# Patient Record
Sex: FEMALE | Race: WHITE | NOT HISPANIC OR LATINO | Employment: FULL TIME | URBAN - METROPOLITAN AREA
[De-identification: names, ages, dates, MRNs, and addresses within clinical notes are randomized per-mention and may not be internally consistent; named-entity substitution may affect disease eponyms.]

---

## 2017-01-13 ENCOUNTER — ALLSCRIPTS OFFICE VISIT (OUTPATIENT)
Dept: OTHER | Facility: OTHER | Age: 37
End: 2017-01-13

## 2017-01-13 DIAGNOSIS — N92.0 EXCESSIVE AND FREQUENT MENSTRUATION WITH REGULAR CYCLE: ICD-10-CM

## 2017-01-18 LAB
HPV 18 (HISTORICAL): NOT DETECTED
HPV HIGH RISK 16/18 (HISTORICAL): NOT DETECTED
HPV16 (HISTORICAL): NOT DETECTED
PAP (HISTORICAL): NORMAL

## 2017-01-26 ENCOUNTER — GENERIC CONVERSION - ENCOUNTER (OUTPATIENT)
Dept: OTHER | Facility: OTHER | Age: 37
End: 2017-01-26

## 2017-01-30 ENCOUNTER — ALLSCRIPTS OFFICE VISIT (OUTPATIENT)
Dept: OTHER | Facility: OTHER | Age: 37
End: 2017-01-30

## 2017-02-02 ENCOUNTER — TRANSCRIBE ORDERS (OUTPATIENT)
Dept: LAB | Facility: HOSPITAL | Age: 37
End: 2017-02-02

## 2017-02-02 ENCOUNTER — HOSPITAL ENCOUNTER (OUTPATIENT)
Dept: RADIOLOGY | Facility: HOSPITAL | Age: 37
Discharge: HOME | End: 2017-02-02
Payer: COMMERCIAL

## 2017-02-02 ENCOUNTER — APPOINTMENT (OUTPATIENT)
Dept: LAB | Facility: HOSPITAL | Age: 37
End: 2017-02-02
Payer: COMMERCIAL

## 2017-02-02 DIAGNOSIS — N92.0 EXCESSIVE AND FREQUENT MENSTRUATION WITH REGULAR CYCLE: ICD-10-CM

## 2017-02-02 LAB
ERYTHROCYTE [DISTWIDTH] IN BLOOD BY AUTOMATED COUNT: 13.1 % (ref 11.6–15.1)
HCT VFR BLD AUTO: 38.5 % (ref 34.8–46.1)
HGB BLD-MCNC: 12.9 G/DL (ref 11.5–15.4)
MCH RBC QN AUTO: 30.8 PG (ref 26.8–34.3)
MCHC RBC AUTO-ENTMCNC: 33.5 G/DL (ref 31.4–37.4)
MCV RBC AUTO: 92 FL (ref 82–98)
PLATELET # BLD AUTO: 261 THOUSANDS/UL (ref 149–390)
PMV BLD AUTO: 10 FL (ref 8.9–12.7)
RBC # BLD AUTO: 4.19 MILLION/UL (ref 3.81–5.12)
T4 FREE SERPL-MCNC: 0.96 NG/DL (ref 0.76–1.46)
TSH SERPL DL<=0.05 MIU/L-ACNC: 0.89 UIU/ML (ref 0.36–3.74)
WBC # BLD AUTO: 9.47 THOUSAND/UL (ref 4.31–10.16)

## 2017-02-02 PROCEDURE — 76830 TRANSVAGINAL US NON-OB: CPT

## 2017-02-02 PROCEDURE — 84439 ASSAY OF FREE THYROXINE: CPT

## 2017-02-02 PROCEDURE — 76856 US EXAM PELVIC COMPLETE: CPT

## 2017-02-02 PROCEDURE — 84443 ASSAY THYROID STIM HORMONE: CPT

## 2017-02-02 PROCEDURE — 36415 COLL VENOUS BLD VENIPUNCTURE: CPT

## 2017-02-02 PROCEDURE — 85027 COMPLETE CBC AUTOMATED: CPT

## 2017-04-20 ENCOUNTER — ALLSCRIPTS OFFICE VISIT (OUTPATIENT)
Dept: OTHER | Facility: OTHER | Age: 37
End: 2017-04-20

## 2017-04-20 DIAGNOSIS — N64.4 MASTODYNIA: ICD-10-CM

## 2017-05-01 ENCOUNTER — HOSPITAL ENCOUNTER (OUTPATIENT)
Dept: ULTRASOUND IMAGING | Facility: CLINIC | Age: 37
Discharge: HOME | End: 2017-05-01
Payer: COMMERCIAL

## 2017-05-01 ENCOUNTER — HOSPITAL ENCOUNTER (OUTPATIENT)
Dept: MAMMOGRAPHY | Facility: CLINIC | Age: 37
Discharge: HOME | End: 2017-05-01
Payer: COMMERCIAL

## 2017-05-01 DIAGNOSIS — N64.4 MASTODYNIA: ICD-10-CM

## 2017-05-01 PROCEDURE — G0204 DX MAMMO INCL CAD BI: HCPCS

## 2017-05-01 PROCEDURE — 76642 ULTRASOUND BREAST LIMITED: CPT

## 2017-05-02 ENCOUNTER — GENERIC CONVERSION - ENCOUNTER (OUTPATIENT)
Dept: OTHER | Facility: OTHER | Age: 37
End: 2017-05-02

## 2017-05-04 ENCOUNTER — ALLSCRIPTS OFFICE VISIT (OUTPATIENT)
Dept: OTHER | Facility: OTHER | Age: 37
End: 2017-05-04

## 2017-05-26 DIAGNOSIS — M25.529 PAIN IN ELBOW: ICD-10-CM

## 2017-06-01 ENCOUNTER — HOSPITAL ENCOUNTER (OUTPATIENT)
Dept: RADIOLOGY | Facility: CLINIC | Age: 37
Discharge: HOME | End: 2017-06-01
Payer: COMMERCIAL

## 2017-06-01 ENCOUNTER — ALLSCRIPTS OFFICE VISIT (OUTPATIENT)
Dept: OTHER | Facility: OTHER | Age: 37
End: 2017-06-01

## 2017-06-01 DIAGNOSIS — M25.529 PAIN IN ELBOW: ICD-10-CM

## 2017-06-01 PROCEDURE — 73080 X-RAY EXAM OF ELBOW: CPT

## 2018-01-10 NOTE — PROGRESS NOTES
Assessment    1  Acute suppurative otitis media of left ear without spontaneous rupture of tympanic   membrane, recurrence not specified (382 00) (H66 002)    Plan  Acute suppurative otitis media of left ear without spontaneous rupture of tympanic  membrane, recurrence not specified    · Antipyrine-Benzocaine 54-14 MG/ML Otic Solution; use 2-3 drops left ear every 2-3  hours as needed for pain   · Sulfamethoxazole-Trimethoprim 800-160 MG Oral Tablet; Take 1 twice daily    Discussion/Summary    Has acute otitis left TM - in past had rash after using Amoxil or Keflex - will rx Bactrim DS BID x 10 days, use Celebrex 400 mg a day x 2-3 days Can use ear drops for pain as needed- discussed could perforate - call if any issues arise  Cough ongoing - no relief w inhaler - if not improved 5-7 days- call for medrol pack (does not feel well w steroids - try to avoid)    pain in foot from prev visit is improved- use celebrex as is and watch hold referral       Chief Complaint  c/o severe ear pain left      History of Present Illness  HPI: onset overnight left ear pain, mucus is clear upper resp - continues w cough  no fever  Sleeps well  Able to teach exercise class - no SOB/ wheeze - tried nebulizer w/out benefit  Active Problems    1  Acute bronchitis (466 0) (J20 9)   2  Encounter for routine gynecological examination (V72 31) (Z01 419)   3  Foot pain, bilateral (729 5) (M79 671,M79 672)   4  Lump of skin (782 2) (R22 9)   5  Menorrhagia (626 2) (N92 0)    Past Medical History    1  History of Depression (311) (F32 9)   2  History of hypertension (V12 59) (Z86 79)   3  History of migraine (V12 49) (Z86 69)   4  History of pilonidal cyst (V13 3) (Z87 2)   5  History of sleep disturbance (V13 89) (R38 248)    Family History    1  Family history of malignant neoplasm of breast (V16 3) (Z80 3)   2  Family history of skin cancer (V16 8) (Z80 8)    3  Family history of Stroke Syndrome (V17 1)    4   Family history of Diabetes Mellitus (V18 0)    5  Family history of Anemia (V18 2)   6  Family history of Arthritis (V17 7)   7  Family history of Heart Disease (V17 49)   8  Family history of Hypertension (V17 49)   9  Family history of Rheumatoid Arthritis    Social History    · Being A Social Drinker   · Exercising Regularly   · Marital History - Currently    · Never A Smoker    Surgical History    1  History of Complete Colonoscopy   2  History of Diagnostic Esophagogastroduodenoscopy   3  History of Incision And Drainage Of Pilonidal Cyst    Current Meds   1  Celecoxib 200 MG Oral Capsule; TAKE 1 CAP ONCE OR TWICE A DAY AS NEED (W/   FOOD); Therapy: 77SUF8084 to (Evaluate:86Ehk4627)  Requested for: 26QAV5012; Last   Rx:19Jan2016 Ordered   2  Peppermint Spirit Spirit; USE AS DIRECTED Recorded   3  Probiotic CAPS; USE AS DIRECTED Recorded   4  Promethazine-Codeine 6 25-10 MG/5ML Oral Syrup; TAKE 5 ML EVERY 4 TO 6 HOURS   AS NEEDED FOR COUGH; Therapy: 94QTS5673 to (Evaluate:02Jan2016); Last Rx:96Bes3681 Ordered    Allergies    1  Amoxicillin CAPS   2  Cephalosporins    Vitals   Recorded: 56MMA5239 02:02PM   Temperature 98 3 F   Heart Rate 92   Systolic 967   Diastolic 60   Height 5 ft 5 in   Weight 173 lb    BMI Calculated 28 79   BSA Calculated 1 86     Physical Exam    Constitutional   General appearance: No acute distress, well appearing and well nourished  Eyes   Conjunctiva and lids: No swelling, erythema or discharge  Ears, Nose, Mouth, and Throat red bulging left TM  Pulmonary   Auscultation of lungs: Clear to auscultation           Signatures   Electronically signed by : Renee Mercer DO; Jan 22 2016  2:35PM EST                       (Author)

## 2018-01-13 VITALS
SYSTOLIC BLOOD PRESSURE: 110 MMHG | DIASTOLIC BLOOD PRESSURE: 64 MMHG | BODY MASS INDEX: 30.82 KG/M2 | HEIGHT: 65 IN | WEIGHT: 185 LBS | HEART RATE: 68 BPM

## 2018-01-13 VITALS
WEIGHT: 178 LBS | DIASTOLIC BLOOD PRESSURE: 68 MMHG | HEART RATE: 68 BPM | BODY MASS INDEX: 27.94 KG/M2 | TEMPERATURE: 97.4 F | RESPIRATION RATE: 14 BRPM | SYSTOLIC BLOOD PRESSURE: 110 MMHG | HEIGHT: 67 IN

## 2018-01-13 NOTE — MISCELLANEOUS
Message   Recorded as Task   Date: 01/26/2017 08:43 AM, Created By: Gordo Garcias   Task Name: Call Back   Assigned To: Dhaval Bateman   Regarding Patient: Edgar Velazquez, Status: In Progress   Comment:    Chela Rinaldi - 26 Jan 2017 8:43 AM     TASK CREATED  Caller: Self; Other; [phone] (Home); [phone] (Work)  ON PROGESTERONE, on day 19,per pt feeling rage, not sleeping & going insane, pls call pt [phone] after 10:15   Reynold Barboza - 26 Jan 2017 11:20 AM     TASK IN PROGRESS   Reynold Barboza - 26 Jan 2017 11:29 AM     TASK EDITED  Pt on day 5 of aygestin and she is "raging" on it  She does get pms sx also  She said she is a  and gained 5 lbs also  Thanks   Ruchi Rodriguez - 26 Jan 2017 1:21 PM     TASK REPLIED TO: Previously Assigned To Ruchi Rodriguez  so if she is on day 5 of Aygestin then she is on day 19 of her cycle  She had pre-existing PMS symptoms (Prior to using Aygestin) so I don't think this is different  If she doesn't like it, she can stop it  She may get a period when she stops it, though  Reynold Barboza - 26 Jan 2017 1:49 PM     TASK EDITED  Her real issue for seeing you was that she has pms rage issues  Everything else is bothersome but she can deal with it but this underlying "rage"s awful  Much worse with aygestin (obviously)  She is willing to go on a ssri  She will have u/s in next few days but would like to start something asap  A friend did well on Zoloft ( per pt) but I think it takes a while to be fully effective  Thanks   Ruchi Rodriguez - 26 Jan 2017 2:00 PM     TASK REPLIED TO: Previously Assigned To Ruchi Rodriguez  Zoloft 25mg po daily x one week then 50mg po daily  Will take 3 months to see big change  Pill check in 3 months  (see my note from a week ago when she said she was completely uninterested in this     Reynold Barboza - 26 Jan 2017 3:11 PM     TASK EDITED  lmtcb   Reynold Barboza - 26 Jan 2017 3:30 PM     TASK EDITED  Pt will start on Zoloft today  Rx Zoloft, 25 mg, #7 sig 1 od and then switch to Zoloft 50 mg, #21, SIG 1 OD AND 2 REFILLS OF 50 MG,  #30  Pt will get u/s done and will make 3 mo apt for pill check        Active Problems    1  Chronic right shoulder pain (719 41,338 29) (M25 511,G89 29)   2  Encounter for gynecological examination with Papanicolaou smear of cervix   (V72 31,V76 2) (B14 016,S22 1)   3  Encounter for routine gynecological examination (V72 31) (Z01 419)   4  Fatigue (780 79) (R53 83)   5  Foot pain, bilateral (729 5) (M79 671,M79 672)   6  Left shoulder pain (719 41) (M25 512)   7  Mastodynia (611 71) (N64 4)   8  Menorrhagia (626 2) (N92 0)   9  Premenstrual syndrome (625 4) (N94 3)   10  Rotator cuff tendinitis, unspecified laterality (726 10) (M75 80)   11  Screening for HPV (human papillomavirus) (V73 81) (Z11 51)   12  Subluxation of left shoulder joint, subsequent encounter (V54 89,831 00) (S43 002D)    Current Meds   1  Norethindrone Acetate 5 MG Oral Tablet (Aygestin); one po daily days 14 to 25 of cycle; Therapy: 80MWV9139 to (Evaluate:59Met6416)  Requested for: 98YNV1007; Last   Rx:13Jan2017 Ordered   2  Probiotic CAPS; USE AS DIRECTED Recorded    Allergies    1  Cephalosporins    Signatures   Electronically signed by :  Triston 81, ; Jan 26 2017  3:30PM EST                       (Author)

## 2018-01-13 NOTE — RESULT NOTES
Verified Results  * XR FOOT 3+ VIEW LEFT 45RVO0937 12:27PM Bee José     Test Name Result Flag Reference   XR FOOT 3+ VW LEFT (Report)     LEFT FOOT     INDICATION: Bilateral foot and heel pain     COMPARISON: None     VIEWS: 3; 3 images     FINDINGS:     There is no acute fracture or dislocation  There is a prominent plantar calcaneal spur  No lytic or blastic lesions are seen  Soft tissues are unremarkable  IMPRESSION:     1  No acute osseous abnormality  2   plantar calcaneal spur         Signed by:   Dinah Bateman MD   1/19/16

## 2018-01-15 VITALS
DIASTOLIC BLOOD PRESSURE: 72 MMHG | HEIGHT: 65 IN | SYSTOLIC BLOOD PRESSURE: 112 MMHG | BODY MASS INDEX: 30.49 KG/M2 | WEIGHT: 183 LBS

## 2018-01-15 VITALS
HEIGHT: 65 IN | SYSTOLIC BLOOD PRESSURE: 102 MMHG | DIASTOLIC BLOOD PRESSURE: 68 MMHG | WEIGHT: 180 LBS | BODY MASS INDEX: 29.99 KG/M2

## 2018-01-15 NOTE — RESULT NOTES
Verified Results  CT SINUS WO CONTRAST [id number] 06:56AM Neel Prophet Order Number: [id number]   Performing Comments: recurrent otitis left w sinusitis left maxillary x 5 months   - Patient Instructions: To schedule this appointment, please contact Central Scheduling at [phone]  Test Name Result Flag Reference   CT SINUS WO CONTRAST (Report)     CT PARANASAL SINUSES     INDICATION: 54-year-old woman with congestion and earaches for 5 months  COMPARISON: None  TECHNIQUE: Axial CT imaging through the sinuses was performed  In addition, sagittal and coronal reformatted images were submitted for interpretation  Examination was performed utilizing techniques to minimize radiation dose, including the use of dose   reduction software  IMAGE QUALITY: Diagnostic  FINDINGS:      FRONTAL SINUSES: Mild mucosal thickening in the floors of the frontal sinuses  No free fluid  No mass  Partial obstruction of the frontal recesses due to mucosal thickening  ETHMOID SINUSES: Mild mucosal thickening in the anterior ethmoid cells  Posterior cells clear  No free fluid, mass or bone destruction  Prominent left ethmoid bulla  Ethmoid roof, cribriform plate and lamina papyracea intact  SPHENOID SINUSES: Mild mucosal thickening in the left-sided sphenoid sinus  No free fluid, mass or bone destruction  Sphenoethmoid recesses patent  MAXILLARY SINUSES: Extensive mucosal thickening/polyposis in both maxillary sinuses  Probable retention cysts in the floors of both sinuses  No free fluid, mass, bone expansion or destruction  ANTERIOR OSTEOMEATAL COMPLEX: Both maxillary infundibula obstructed by mucosal thickening/polyps uncinate processes intact  NASAL CAVITY:   Septum: Mild right-sided nasal septal deviation with large right-sided bony septal spur  Turbinates: Normal    Basal lamellae: Normal    Hiatus semilunaris: Each hiatus semilunaris patent       NASOPHARYNX: Normal      SKULL BASE:    Carotid canals: Intact  Foramina rotunda and ovale: Normal    Vidian canals: Normal    Pterygopalatine fossae: Normal    Orbital apices: Normal    Mastoids and middle ear cavities: Included portions well-pneumatized  SOFT TISSUES: Normal      INCLUDED PORTIONS OF THE BRAIN: Normal        IMPRESSION:     Chronic pansinusitis, most extensive in the maxillary sinuses, without evidence of free fluid in the sinuses, mass or bone destruction  Obstruction of both maxillary infundibula and ostia by mucosal thickening/polyps         Workstation performed: [id number]     Signed by:   Johnny Lee MD   5/2/16

## 2018-01-16 NOTE — MISCELLANEOUS
Message   Recorded as Task   Date: 05/02/2017 07:36 AM, Created By: Miguel Brito   Task Name: Go to Result   Assigned To: Yossi Hand   Regarding Patient: Jamari Pires, Status: In Progress   Teresa Bose - 02 May 2017 7:36 AM     TASK CREATED  breast ultrasound shows no abnormality  If her breast tenderness persists could offer second opinion with breast specialist    Jossie Velazquez - 02 May 2017 8:27 AM     TASK IN PROGRESS   Jossie Velazquez - 02 May 2017 8:31 AM     TASK EDITED  pt informed re Ruchi's instructions    gave her name and number for Nichol Elliott    she will contact her if tenderness continues        Active Problems    1  Anxiety (300 00) (F41 9)   2  Chronic right shoulder pain (719 41,338 29) (M25 511,G89 29)   3  Encounter for routine gynecological examination (V72 31) (Z01 419)   4  Fatigue (780 79) (R53 83)   5  Foot pain, bilateral (729 5) (M79 671,M79 672)   6  Left shoulder pain (719 41) (M25 512)   7  Mastodynia (611 71) (N64 4)   8  Menorrhagia (626 2) (N92 0)   9  Premenstrual syndrome (625 4) (N94 3)   10  Rotator cuff tendinitis, unspecified laterality (726 10) (M75 80)   11  Subluxation of left shoulder joint, subsequent encounter (V54 89,831 00) (S43 002D)    Current Meds   1  ALPRAZolam 0 25 MG Oral Tablet; use 1/2 to 1 pill daily as need for anxiety; Therapy: 81IBU2007 to (Evaluate:01Mar2017)  Requested for: 02YYC2782; Last   Rx:30Jan2017 Ordered   2  Probiotic CAPS; USE AS DIRECTED Recorded   3  Sertraline HCl - 50 MG Oral Tablet; TAKE 2 TABLETS DAILY; Therapy: 97IJL3999 to (Evaluate:15Apr2018)  Requested for: 20Apr2017; Last   Rx:20Apr2017 Ordered    Allergies    1   Cephalosporins    Signatures   Electronically signed by : Jayson Agosto, ; May  2 2017  8:31AM EST                       (Author)

## 2018-05-15 DIAGNOSIS — F41.9 ANXIETY: Primary | ICD-10-CM

## 2018-05-15 NOTE — TELEPHONE ENCOUNTER
Received call from [facility] pharmacy for refill of zoloft 50 mg, takes 2 daily  Patient did not have a yearly and is due so I called her and scheduled for 5/31  I placed an order for 1 month supply

## 2018-05-31 ENCOUNTER — ANNUAL EXAM (OUTPATIENT)
Dept: OBGYN CLINIC | Facility: CLINIC | Age: 38
End: 2018-05-31
Payer: COMMERCIAL

## 2018-05-31 VITALS
SYSTOLIC BLOOD PRESSURE: 114 MMHG | BODY MASS INDEX: 31.65 KG/M2 | HEIGHT: 65 IN | DIASTOLIC BLOOD PRESSURE: 74 MMHG | WEIGHT: 190 LBS

## 2018-05-31 DIAGNOSIS — F41.9 ANXIETY: ICD-10-CM

## 2018-05-31 DIAGNOSIS — Z12.31 ENCOUNTER FOR SCREENING MAMMOGRAM FOR MALIGNANT NEOPLASM OF BREAST: ICD-10-CM

## 2018-05-31 DIAGNOSIS — Z01.419 ENCNTR FOR GYN EXAM (GENERAL) (ROUTINE) W/O ABN FINDINGS: Primary | ICD-10-CM

## 2018-05-31 PROCEDURE — 99395 PREV VISIT EST AGE 18-39: CPT | Performed by: PHYSICIAN ASSISTANT

## 2018-05-31 RX ORDER — IBUPROFEN 600 MG/1
TABLET ORAL
COMMUNITY
Start: 2018-05-21

## 2018-05-31 RX ORDER — ALPRAZOLAM 0.25 MG/1
TABLET ORAL
COMMUNITY
Start: 2012-02-02

## 2018-05-31 NOTE — PROGRESS NOTES
Cherise Gentile  1980      CC:  Yearly exam    S:  43 y o  female here for yearly exam  Her cycles are either very light and regular or heavy and spaced out, consistent with her past menstrual history with her PCOS  She has had two foot surgeries in the past six months and has gained 25 lbs  This has worsened her periods  She would like to restart metformin  She is sexually active  Last Pap 1/13/17 neg/neg    Current Outpatient Prescriptions:     ALPRAZolam (XANAX) 0 25 mg tablet, Take by mouth, Disp: , Rfl:     ibuprofen (MOTRIN) 600 mg tablet, , Disp: , Rfl:     Probiotic Product (PROBIOTIC-10) CAPS, Take by mouth Twice daily, Disp: , Rfl:     sertraline (ZOLOFT) 50 mg tablet, Take 2 tablets daily, Disp: 60 tablet, Rfl: 0  Social History     Social History    Marital status: /Civil Union     Spouse name: N/A    Number of children: N/A    Years of education: N/A     Occupational History    Not on file  Social History Main Topics    Smoking status: Never Smoker    Smokeless tobacco: Never Used    Alcohol use Yes      Comment: socially     Drug use: No    Sexual activity: Yes     Partners: Male     Birth control/ protection: Condom Male     Other Topics Concern    Not on file     Social History Narrative    No narrative on file     Family History   Problem Relation Age of Onset    Skin cancer Mother     Breast cancer Mother 39      Past Medical History:   Diagnosis Date    Herpes     type I    Miscarriage     X2    Polycystic ovary syndrome         O:  Blood pressure 114/74, height 5' 4 96" (1 65 m), weight 86 2 kg (190 lb), last menstrual period 04/22/2018  Patient appears well and is not in distress  Neck is supple without masses  Breasts are symmetrical without mass, tenderness, nipple discharge, skin changes or adenopathy  Abdomen is soft and nontender without masses  External genitals are normal without lesions or rashes    Vagina is normal without discharge or bleeding  Cervix is normal without discharge or lesion  Uterus is normal, mobile, nontender without palpable mass  Adnexa are normal, nontender, without palpable mass  A:  Yearly exam      P:   Pap due 2022   Metformin, sertraline sent to pharmacy   RTO one year for yearly exam or sooner as needed

## 2018-06-12 DIAGNOSIS — F41.9 ANXIETY: ICD-10-CM
